# Patient Record
Sex: FEMALE | Race: WHITE | NOT HISPANIC OR LATINO
[De-identification: names, ages, dates, MRNs, and addresses within clinical notes are randomized per-mention and may not be internally consistent; named-entity substitution may affect disease eponyms.]

---

## 2021-11-02 ENCOUNTER — APPOINTMENT (OUTPATIENT)
Dept: NEUROSURGERY | Facility: CLINIC | Age: 81
End: 2021-11-02
Payer: MEDICARE

## 2021-11-09 ENCOUNTER — APPOINTMENT (OUTPATIENT)
Dept: NEUROSURGERY | Facility: CLINIC | Age: 81
End: 2021-11-09
Payer: MEDICARE

## 2021-11-09 VITALS
OXYGEN SATURATION: 97 % | DIASTOLIC BLOOD PRESSURE: 74 MMHG | SYSTOLIC BLOOD PRESSURE: 111 MMHG | BODY MASS INDEX: 22.29 KG/M2 | HEIGHT: 67 IN | WEIGHT: 142 LBS | HEART RATE: 74 BPM

## 2021-11-09 DIAGNOSIS — M54.12 RADICULOPATHY, CERVICAL REGION: ICD-10-CM

## 2021-11-09 PROCEDURE — 99203 OFFICE O/P NEW LOW 30 MIN: CPT

## 2021-11-11 NOTE — PHYSICAL EXAM
[General Appearance - Alert] : alert [General Appearance - In No Acute Distress] : in no acute distress [Oriented To Time, Place, And Person] : oriented to person, place, and time [Impaired Insight] : insight and judgment were intact [Cranial Nerves Optic (II)] : visual acuity intact bilaterally,  pupils equal round and reactive to light [Cranial Nerves Oculomotor (III)] : extraocular motion intact [Cranial Nerves Trigeminal (V)] : facial sensation intact symmetrically [Cranial Nerves Facial (VII)] : face symmetrical [Cranial Nerves Vestibulocochlear (VIII)] : hearing was intact bilaterally [Cranial Nerves Glossopharyngeal (IX)] : tongue and palate midline [Cranial Nerves Accessory (XI - Cranial And Spinal)] : head turning and shoulder shrug symmetric [Cranial Nerves Hypoglossal (XII)] : there was no tongue deviation with protrusion [5] : S1 ankle flexors 5/5 [Normal] : normal [Sclera] : the sclera and conjunctiva were normal [PERRL With Normal Accommodation] : pupils were equal in size, round, reactive to light, with normal accommodation [Hearing Threshold Finger Rub Not Baca] : hearing was normal [Neck Appearance] : the appearance of the neck was normal [] : no respiratory distress [Respiration, Rhythm And Depth] : normal respiratory rhythm and effort [Edema] : there was no peripheral edema [Abnormal Walk] : normal gait [Involuntary Movements] : no involuntary movements were seen [Motor Tone] : muscle strength and tone were normal [Skin Color & Pigmentation] : normal skin color and pigmentation

## 2021-11-18 NOTE — ASSESSMENT
[FreeTextEntry1] : Reviewed Stand up MRI images showing C3/4, C45/ moderate spinal cord compression.  She demonstrates mild decreased ROM with left cervical rotation, and suffers from Left sided radicular pain in upper traps to shoulder.  I recommend conservative management at this time considering her age, and fully functional status.\par \par 1)  PT - cervical\par 2)  r/u PRN

## 2021-11-18 NOTE — HISTORY OF PRESENT ILLNESS
[FreeTextEntry1] : chronic neck/ back pain [de-identified] : 81 yo female with PMH of HTN, who complains of chronic neck pain with onset of 15 years which has worsened over the last year.  She arrives for an initial consult and imaging review.  \par Pain is in the left cervical to upper trap region rated 8/10, range 4/10 to 9/10.  \par Aggravated:  standing, turning\par Relieved:  heat, lying down, medications, rest\par Pain management:  treated by a Chiropractor with minimal relief.  PT made her feel worse with fatigue.  \par Pain medications include OTC Advil, Tylenol, and topical creams.   \par \par Stand up MRI reports in allscripts:\par 9/30/21 MRI Cervical wo\par 9/30/21 MRI Lumbar wo

## 2021-11-18 NOTE — REVIEW OF SYSTEMS
[Negative] : Endocrine [Abdominal Pain] : no abdominal pain [Vomiting] : no vomiting [Diarrhea] : no diarrhea [Incontinence] : no incontinence [Skin Lesions] : no skin lesions [Easy Bleeding] : no tendency for easy bleeding [Easy Bruising] : no tendency for easy bruising

## 2023-11-01 ENCOUNTER — APPOINTMENT (RX ONLY)
Dept: URBAN - METROPOLITAN AREA CLINIC 123 | Facility: CLINIC | Age: 83
Setting detail: DERMATOLOGY
End: 2023-11-01

## 2023-11-01 DIAGNOSIS — L85.3 XEROSIS CUTIS: ICD-10-CM

## 2023-11-01 DIAGNOSIS — L57.0 ACTINIC KERATOSIS: ICD-10-CM

## 2023-11-01 DIAGNOSIS — L82.0 INFLAMED SEBORRHEIC KERATOSIS: ICD-10-CM

## 2023-11-01 DIAGNOSIS — Z85.828 PERSONAL HISTORY OF OTHER MALIGNANT NEOPLASM OF SKIN: ICD-10-CM

## 2023-11-01 DIAGNOSIS — D18.0 HEMANGIOMA: ICD-10-CM

## 2023-11-01 DIAGNOSIS — D22 MELANOCYTIC NEVI: ICD-10-CM

## 2023-11-01 DIAGNOSIS — L81.4 OTHER MELANIN HYPERPIGMENTATION: ICD-10-CM | Status: STABLE

## 2023-11-01 DIAGNOSIS — L82.1 OTHER SEBORRHEIC KERATOSIS: ICD-10-CM

## 2023-11-01 PROBLEM — D22.5 MELANOCYTIC NEVI OF TRUNK: Status: ACTIVE | Noted: 2023-11-01

## 2023-11-01 PROBLEM — D18.01 HEMANGIOMA OF SKIN AND SUBCUTANEOUS TISSUE: Status: ACTIVE | Noted: 2023-11-01

## 2023-11-01 PROCEDURE — ? ADDITIONAL NOTES

## 2023-11-01 PROCEDURE — 17000 DESTRUCT PREMALG LESION: CPT | Mod: 59

## 2023-11-01 PROCEDURE — ? LIQUID NITROGEN

## 2023-11-01 PROCEDURE — 17111 DESTRUCTION B9 LESIONS 15/>: CPT

## 2023-11-01 PROCEDURE — 99203 OFFICE O/P NEW LOW 30 MIN: CPT | Mod: 25

## 2023-11-01 PROCEDURE — ? SUNSCREEN RECOMMENDATIONS

## 2023-11-01 PROCEDURE — ? COUNSELING

## 2023-11-01 ASSESSMENT — LOCATION DETAILED DESCRIPTION DERM
LOCATION DETAILED: LEFT CENTRAL MALAR CHEEK
LOCATION DETAILED: RIGHT ANTERIOR DISTAL THIGH
LOCATION DETAILED: LEFT ANTERIOR PROXIMAL THIGH
LOCATION DETAILED: RIGHT MID-UPPER BACK
LOCATION DETAILED: RIGHT RIB CAGE
LOCATION DETAILED: LEFT PROXIMAL DORSAL FOREARM
LOCATION DETAILED: NASAL DORSUM
LOCATION DETAILED: LEFT ANTERIOR DISTAL THIGH
LOCATION DETAILED: RIGHT PROXIMAL DORSAL FOREARM
LOCATION DETAILED: LEFT VENTRAL DISTAL FOREARM
LOCATION DETAILED: RIGHT ANTERIOR SHOULDER
LOCATION DETAILED: LEFT DISTAL DORSAL FOREARM
LOCATION DETAILED: RIGHT RADIAL DORSAL HAND
LOCATION DETAILED: RIGHT DISTAL DORSAL FOREARM
LOCATION DETAILED: RIGHT ANTERIOR PROXIMAL THIGH
LOCATION DETAILED: LEFT SUPERIOR UPPER BACK
LOCATION DETAILED: RIGHT TRAGUS
LOCATION DETAILED: RIGHT POSTERIOR SHOULDER
LOCATION DETAILED: RIGHT ULNAR DORSAL HAND
LOCATION DETAILED: LEFT VENTRAL PROXIMAL FOREARM

## 2023-11-01 ASSESSMENT — LOCATION ZONE DERM
LOCATION ZONE: EAR
LOCATION ZONE: FACE
LOCATION ZONE: HAND
LOCATION ZONE: ARM
LOCATION ZONE: NOSE
LOCATION ZONE: LEG
LOCATION ZONE: TRUNK

## 2023-11-01 ASSESSMENT — LOCATION SIMPLE DESCRIPTION DERM
LOCATION SIMPLE: RIGHT THIGH
LOCATION SIMPLE: RIGHT EAR
LOCATION SIMPLE: RIGHT SHOULDER
LOCATION SIMPLE: LEFT FOREARM
LOCATION SIMPLE: LEFT THIGH
LOCATION SIMPLE: NOSE
LOCATION SIMPLE: RIGHT UPPER BACK
LOCATION SIMPLE: RIGHT FOREARM
LOCATION SIMPLE: LEFT CHEEK
LOCATION SIMPLE: LEFT UPPER BACK
LOCATION SIMPLE: RIGHT HAND
LOCATION SIMPLE: ABDOMEN

## 2023-11-01 NOTE — PROCEDURE: LIQUID NITROGEN
Spray Paint Technique: No
Show Aperture Variable?: Yes
Medical Necessity Information: It is in your best interest to select a reason for this procedure from the list below. All of these items fulfill various CMS LCD requirements except the new and changing color options.
Medical Necessity Clause: This procedure was medically necessary because the lesions that were treated were:
Number Of Freeze-Thaw Cycles: 3 freeze-thaw cycles
Duration Of Freeze Thaw-Cycle (Seconds): 6
Detail Level: Detailed
Post-Care Instructions: I reviewed with the patient in detail post-care instructions. Patient is to wear sunprotection, and avoid picking at any of the treated lesions. Pt may apply Vaseline to crusted or scabbing areas.
Spray Paint Text: The liquid nitrogen was applied to the skin utilizing a spray paint frosting technique.
Consent: The patient's consent was obtained including but not limited to risks of crusting, scabbing, blistering, scarring, darker or lighter pigmentary change, recurrence, incomplete removal and infection.
Duration Of Freeze Thaw-Cycle (Seconds): 8
Simple: Patient demonstrates quick and easy understanding

## 2024-02-26 ENCOUNTER — APPOINTMENT (RX ONLY)
Dept: URBAN - METROPOLITAN AREA CLINIC 123 | Facility: CLINIC | Age: 84
Setting detail: DERMATOLOGY
End: 2024-02-26

## 2024-02-26 DIAGNOSIS — Z85.828 PERSONAL HISTORY OF OTHER MALIGNANT NEOPLASM OF SKIN: ICD-10-CM

## 2024-02-26 DIAGNOSIS — L57.0 ACTINIC KERATOSIS: ICD-10-CM | Status: INADEQUATELY CONTROLLED

## 2024-02-26 DIAGNOSIS — L81.4 OTHER MELANIN HYPERPIGMENTATION: ICD-10-CM

## 2024-02-26 DIAGNOSIS — L82.0 INFLAMED SEBORRHEIC KERATOSIS: ICD-10-CM

## 2024-02-26 PROCEDURE — 17003 DESTRUCT PREMALG LES 2-14: CPT | Mod: 59

## 2024-02-26 PROCEDURE — ? COUNSELING

## 2024-02-26 PROCEDURE — 99212 OFFICE O/P EST SF 10 MIN: CPT | Mod: 25

## 2024-02-26 PROCEDURE — 17000 DESTRUCT PREMALG LESION: CPT | Mod: 59

## 2024-02-26 PROCEDURE — ? LIQUID NITROGEN

## 2024-02-26 PROCEDURE — 17110 DESTRUCTION B9 LES UP TO 14: CPT

## 2024-02-26 ASSESSMENT — LOCATION ZONE DERM
LOCATION ZONE: NECK
LOCATION ZONE: NOSE
LOCATION ZONE: FACE

## 2024-02-26 ASSESSMENT — LOCATION SIMPLE DESCRIPTION DERM
LOCATION SIMPLE: NECK
LOCATION SIMPLE: LEFT CHEEK
LOCATION SIMPLE: RIGHT CHEEK
LOCATION SIMPLE: NOSE

## 2024-02-26 ASSESSMENT — LOCATION DETAILED DESCRIPTION DERM
LOCATION DETAILED: NASAL DORSUM
LOCATION DETAILED: RIGHT SUPERIOR LATERAL BUCCAL CHEEK
LOCATION DETAILED: RIGHT INFERIOR LATERAL NECK
LOCATION DETAILED: RIGHT INFERIOR LATERAL MALAR CHEEK
LOCATION DETAILED: LEFT INFERIOR LATERAL MALAR CHEEK
LOCATION DETAILED: LEFT INFERIOR PREAURICULAR CHEEK
LOCATION DETAILED: RIGHT CENTRAL MANDIBULAR CHEEK
LOCATION DETAILED: RIGHT SUPERIOR LATERAL MALAR CHEEK
LOCATION DETAILED: LEFT SUPERIOR LATERAL BUCCAL CHEEK
LOCATION DETAILED: LEFT SUPERIOR LATERAL MALAR CHEEK
LOCATION DETAILED: RIGHT LATERAL MANDIBULAR CHEEK
LOCATION DETAILED: RIGHT CENTRAL MALAR CHEEK
LOCATION DETAILED: LEFT LATERAL MANDIBULAR CHEEK

## 2024-02-26 NOTE — PROCEDURE: LIQUID NITROGEN
Post-Care Instructions: I reviewed with the patient in detail post-care instructions. Patient is to wear sunprotection, and avoid picking at any of the treated lesions. Pt may apply Vaseline to crusted or scabbing areas.
Render Post-Care Instructions In Note?: no
Detail Level: Detailed
Duration Of Freeze Thaw-Cycle (Seconds): 0
Show Aperture Variable?: Yes
Consent: The patient's consent was obtained including but not limited to risks of crusting, scabbing, blistering, scarring, darker or lighter pigmentary change, recurrence, incomplete removal and infection.
Medical Necessity Information: It is in your best interest to select a reason for this procedure from the list below. All of these items fulfill various CMS LCD requirements except the new and changing color options.
Medical Necessity Clause: This procedure was medically necessary because the lesions that were treated were: growing.
Spray Paint Text: The liquid nitrogen was applied to the skin utilizing a spray paint frosting technique.

## 2024-03-20 ENCOUNTER — APPOINTMENT (RX ONLY)
Dept: URBAN - METROPOLITAN AREA CLINIC 123 | Facility: CLINIC | Age: 84
Setting detail: DERMATOLOGY
End: 2024-03-20

## 2024-03-20 DIAGNOSIS — L82.0 INFLAMED SEBORRHEIC KERATOSIS: ICD-10-CM

## 2024-03-20 DIAGNOSIS — Z41.9 ENCOUNTER FOR PROCEDURE FOR PURPOSES OTHER THAN REMEDYING HEALTH STATE, UNSPECIFIED: ICD-10-CM

## 2024-03-20 DIAGNOSIS — L81.4 OTHER MELANIN HYPERPIGMENTATION: ICD-10-CM

## 2024-03-20 PROCEDURE — ? COUNSELING

## 2024-03-20 PROCEDURE — ? COSMETIC CONSULTATION: FILLERS

## 2024-03-20 PROCEDURE — 17110 DESTRUCTION B9 LES UP TO 14: CPT

## 2024-03-20 PROCEDURE — ? LIQUID NITROGEN

## 2024-03-20 PROCEDURE — ? ADDITIONAL NOTES

## 2024-03-20 PROCEDURE — 99212 OFFICE O/P EST SF 10 MIN: CPT | Mod: 25

## 2024-03-20 ASSESSMENT — LOCATION ZONE DERM
LOCATION ZONE: FACE
LOCATION ZONE: SCALP

## 2024-03-20 ASSESSMENT — LOCATION DETAILED DESCRIPTION DERM
LOCATION DETAILED: RIGHT INFERIOR PREAURICULAR CHEEK
LOCATION DETAILED: RIGHT CENTRAL ZYGOMA
LOCATION DETAILED: RIGHT SUPERIOR LATERAL MALAR CHEEK
LOCATION DETAILED: LEFT SUPERIOR MEDIAL BUCCAL CHEEK
LOCATION DETAILED: RIGHT INFERIOR LATERAL MALAR CHEEK
LOCATION DETAILED: RIGHT INFERIOR POSTAURICULAR SKIN
LOCATION DETAILED: LEFT SUPERIOR CENTRAL BUCCAL CHEEK

## 2024-03-20 ASSESSMENT — LOCATION SIMPLE DESCRIPTION DERM
LOCATION SIMPLE: SCALP
LOCATION SIMPLE: RIGHT ZYGOMA
LOCATION SIMPLE: RIGHT CHEEK
LOCATION SIMPLE: LEFT CHEEK

## 2024-03-20 NOTE — PROCEDURE: ADDITIONAL NOTES
Additional Notes: Rec: Danna Blancas $700/ syringe, rec 1-2 syringes: perioral area. Pt aware 1 hr numb prior. Pt was given numbing cream
Render Risk Assessment In Note?: no
Detail Level: Simple
Additional Notes: (+) hx of fillers in past. Concerned of perioral and midface.

## 2024-03-20 NOTE — PROCEDURE: LIQUID NITROGEN
Duration Of Freeze Thaw-Cycle (Seconds): 6
Show Spray Paint Technique Variable?: Yes
Spray Paint Text: The liquid nitrogen was applied to the skin utilizing a spray paint frosting technique.
Detail Level: Detailed
Post-Care Instructions: I reviewed with the patient in detail post-care instructions. Patient is to wear sunprotection, and avoid picking at any of the treated lesions. Pt may apply Vaseline to crusted or scabbing areas.
Add 52 Modifier (Optional): no
Consent: The patient's consent was obtained including but not limited to risks of crusting, scabbing, blistering, scarring, darker or lighter pigmentary change, recurrence, incomplete removal and infection.
Medical Necessity Information: It is in your best interest to select a reason for this procedure from the list below. All of these items fulfill various CMS LCD requirements except the new and changing color options.
Number Of Freeze-Thaw Cycles: 3 freeze-thaw cycles
Medical Necessity Clause: This procedure was medically necessary because the lesions that were treated were:

## 2024-03-28 ENCOUNTER — APPOINTMENT (RX ONLY)
Dept: URBAN - METROPOLITAN AREA CLINIC 123 | Facility: CLINIC | Age: 84
Setting detail: DERMATOLOGY
End: 2024-03-28

## 2024-03-28 DIAGNOSIS — Z41.9 ENCOUNTER FOR PROCEDURE FOR PURPOSES OTHER THAN REMEDYING HEALTH STATE, UNSPECIFIED: ICD-10-CM

## 2024-03-28 DIAGNOSIS — L57.0 ACTINIC KERATOSIS: ICD-10-CM

## 2024-03-28 PROCEDURE — ? FILLERS

## 2024-03-28 PROCEDURE — 17110 DESTRUCTION B9 LES UP TO 14: CPT | Mod: 79

## 2024-03-28 PROCEDURE — ? PRESCRIPTION

## 2024-03-28 PROCEDURE — ? ELECTRODESICCATION

## 2024-03-28 RX ORDER — TRETIONIN 1 MG/G
CREAM TOPICAL QHS
Qty: 45 | Refills: 11 | Status: ERX | COMMUNITY
Start: 2024-03-28

## 2024-03-28 RX ADMIN — TRETIONIN: 1 CREAM TOPICAL at 00:00

## 2024-03-28 ASSESSMENT — LOCATION DETAILED DESCRIPTION DERM: LOCATION DETAILED: LEFT MEDIAL MALAR CHEEK

## 2024-03-28 ASSESSMENT — LOCATION SIMPLE DESCRIPTION DERM: LOCATION SIMPLE: LEFT CHEEK

## 2024-03-28 ASSESSMENT — LOCATION ZONE DERM: LOCATION ZONE: FACE

## 2024-03-28 NOTE — PROCEDURE: FILLERS
Additional Area 3 Volume In Cc: 0
Include Cannula Information In Note?: No
Anesthesia Volume In Cc: 0.5
Consent: Written consent obtained. Risks include but not limited to bruising, beading, irregular texture, ulceration, infection, allergic reaction, scar formation, incomplete augmentation, temporary nature, procedural pain.
Filler: Restylane Kysse
Lot #: [id number]
Post-Care Instructions: Patient instructed to apply ice to reduce swelling.
Expiration Date (Month Year): 06-
Detail Level: Detailed
Price (Use Numbers Only, No Special Characters Or $): 1400
Map Statment: See Attach Map for Complete Details
Include Documentation That Aspiration Was Performed Prior To Injecting Filler:: Yes
Lot #: [id number]
Aspiration Statement: Aspiration was performed prior to injecting site with filler.
Expiration Date (Month Year): 06/30/2923

## 2024-03-28 NOTE — PROCEDURE: ELECTRODESICCATION
Detail Level: Simple
Consent: The patient's consent was obtained including but not limited to risks of crusting, scabbing, blistering, scarring, darker or lighter pigmentary change, recurrence, incomplete removal and infection.
Medical Necessity Clause: This procedure was medically necessary because the lesions that were treated were:
Medical Necessity Information: It is in your best interest to select a reason for this procedure from the list below. All of these items fulfill various CMS LCD requirements except the new and changing color options.
Include Z78.9 (Other Specified Conditions Influencing Health Status) As An Associated Diagnosis?: No
Miles: 3
Post-Care Instructions: I reviewed with the patient in detail post-care instructions. Patient is to wear sunprotection, and avoid picking at any of the treated lesions. Pt may apply Vaseline to crusted or scabbing areas

## 2024-07-03 ENCOUNTER — APPOINTMENT (RX ONLY)
Dept: URBAN - METROPOLITAN AREA CLINIC 123 | Facility: CLINIC | Age: 84
Setting detail: DERMATOLOGY
End: 2024-07-03

## 2024-07-03 DIAGNOSIS — L82.0 INFLAMED SEBORRHEIC KERATOSIS: ICD-10-CM

## 2024-07-03 DIAGNOSIS — K13.0 DISEASES OF LIPS: ICD-10-CM

## 2024-07-03 DIAGNOSIS — Z85.828 PERSONAL HISTORY OF OTHER MALIGNANT NEOPLASM OF SKIN: ICD-10-CM

## 2024-07-03 PROCEDURE — 17111 DESTRUCTION B9 LESIONS 15/>: CPT

## 2024-07-03 PROCEDURE — ? ADDITIONAL NOTES

## 2024-07-03 PROCEDURE — ? COUNSELING

## 2024-07-03 PROCEDURE — ? BENIGN DESTRUCTION

## 2024-07-03 PROCEDURE — 99212 OFFICE O/P EST SF 10 MIN: CPT | Mod: 25

## 2024-07-03 ASSESSMENT — LOCATION ZONE DERM
LOCATION ZONE: NOSE
LOCATION ZONE: LIP
LOCATION ZONE: FACE

## 2024-07-03 ASSESSMENT — LOCATION DETAILED DESCRIPTION DERM
LOCATION DETAILED: LEFT LATERAL MANDIBULAR CHEEK
LOCATION DETAILED: RIGHT LATERAL BUCCAL CHEEK
LOCATION DETAILED: LEFT INFERIOR CENTRAL MALAR CHEEK
LOCATION DETAILED: RIGHT SUPERIOR LATERAL BUCCAL CHEEK
LOCATION DETAILED: LEFT INFERIOR LATERAL MALAR CHEEK
LOCATION DETAILED: LEFT CENTRAL MALAR CHEEK
LOCATION DETAILED: NASAL DORSUM
LOCATION DETAILED: RIGHT LATERAL MANDIBULAR CHEEK
LOCATION DETAILED: LEFT SUPERIOR LATERAL BUCCAL CHEEK
LOCATION DETAILED: RIGHT INFERIOR VERMILION LIP

## 2024-07-03 ASSESSMENT — LOCATION SIMPLE DESCRIPTION DERM
LOCATION SIMPLE: RIGHT CHEEK
LOCATION SIMPLE: RIGHT LIP
LOCATION SIMPLE: NOSE
LOCATION SIMPLE: LEFT CHEEK

## 2024-07-03 NOTE — PROCEDURE: BENIGN DESTRUCTION
Render Post-Care Instructions In Note?: no
Post-Care Instructions: I reviewed with the patient in detail post-care instructions. Patient is to wear sunprotection, and avoid picking at any of the treated lesions. Pt may apply Vaseline to crusted or scabbing areas.
Medical Necessity Information: It is in your best interest to select a reason for this procedure from the list below. All of these items fulfill various CMS LCD requirements except the new and changing color options.
Detail Level: Detailed
Medical Necessity Clause: This procedure was medically necessary because the lesions that were treated were:
Treatment Number (Will Not Render If 0): 0
Consent: The patient's consent was obtained including but not limited to risks of crusting, scabbing, blistering, scarring, darker or lighter pigmentary change, recurrence, incomplete removal and infection.

## 2024-07-03 NOTE — PROCEDURE: ADDITIONAL NOTES
Detail Level: Simple
Render Risk Assessment In Note?: no
Additional Notes: Pt applied BLT prior to appt.

## 2024-08-12 NOTE — PROCEDURE: COUNSELING
Detail Level: Zone
Patient informed of need for labs.   .INFORMED PATIENT & PATIENT VERBALIZED UNDERSTANDING.    
Detail Level: Detailed

## 2025-06-17 ENCOUNTER — EMERGENCY (EMERGENCY)
Facility: HOSPITAL | Age: 85
LOS: 1 days | End: 2025-06-17
Attending: STUDENT IN AN ORGANIZED HEALTH CARE EDUCATION/TRAINING PROGRAM
Payer: MEDICARE

## 2025-06-17 VITALS
RESPIRATION RATE: 22 BRPM | TEMPERATURE: 98 F | OXYGEN SATURATION: 98 % | SYSTOLIC BLOOD PRESSURE: 184 MMHG | DIASTOLIC BLOOD PRESSURE: 91 MMHG | HEART RATE: 97 BPM | HEIGHT: 66 IN | WEIGHT: 130.07 LBS

## 2025-06-17 VITALS
TEMPERATURE: 98 F | HEART RATE: 92 BPM | SYSTOLIC BLOOD PRESSURE: 169 MMHG | OXYGEN SATURATION: 98 % | RESPIRATION RATE: 20 BRPM | DIASTOLIC BLOOD PRESSURE: 80 MMHG

## 2025-06-17 LAB
ALBUMIN SERPL ELPH-MCNC: 4.7 G/DL — SIGNIFICANT CHANGE UP (ref 3.3–5)
ALP SERPL-CCNC: 106 U/L — SIGNIFICANT CHANGE UP (ref 40–120)
ALT FLD-CCNC: 16 U/L — SIGNIFICANT CHANGE UP (ref 10–45)
ANION GAP SERPL CALC-SCNC: 15 MMOL/L — SIGNIFICANT CHANGE UP (ref 5–17)
APTT BLD: 28.1 SEC — SIGNIFICANT CHANGE UP (ref 26.1–36.8)
AST SERPL-CCNC: 23 U/L — SIGNIFICANT CHANGE UP (ref 10–40)
BASOPHILS # BLD AUTO: 0.07 K/UL — SIGNIFICANT CHANGE UP (ref 0–0.2)
BASOPHILS NFR BLD AUTO: 0.9 % — SIGNIFICANT CHANGE UP (ref 0–2)
BILIRUB SERPL-MCNC: 0.6 MG/DL — SIGNIFICANT CHANGE UP (ref 0.2–1.2)
BLD GP AB SCN SERPL QL: NEGATIVE — SIGNIFICANT CHANGE UP
BUN SERPL-MCNC: 17 MG/DL — SIGNIFICANT CHANGE UP (ref 7–23)
CALCIUM SERPL-MCNC: 9.7 MG/DL — SIGNIFICANT CHANGE UP (ref 8.4–10.5)
CHLORIDE SERPL-SCNC: 103 MMOL/L — SIGNIFICANT CHANGE UP (ref 96–108)
CO2 SERPL-SCNC: 23 MMOL/L — SIGNIFICANT CHANGE UP (ref 22–31)
CREAT SERPL-MCNC: 0.91 MG/DL — SIGNIFICANT CHANGE UP (ref 0.5–1.3)
EGFR: 61 ML/MIN/1.73M2 — SIGNIFICANT CHANGE UP
EGFR: 61 ML/MIN/1.73M2 — SIGNIFICANT CHANGE UP
EOSINOPHIL # BLD AUTO: 0.11 K/UL — SIGNIFICANT CHANGE UP (ref 0–0.5)
EOSINOPHIL NFR BLD AUTO: 1.3 % — SIGNIFICANT CHANGE UP (ref 0–6)
GLUCOSE SERPL-MCNC: 103 MG/DL — HIGH (ref 70–99)
HCT VFR BLD CALC: 40.3 % — SIGNIFICANT CHANGE UP (ref 34.5–45)
HGB BLD-MCNC: 13.3 G/DL — SIGNIFICANT CHANGE UP (ref 11.5–15.5)
IMM GRANULOCYTES # BLD AUTO: 0.03 K/UL — SIGNIFICANT CHANGE UP (ref 0–0.07)
IMM GRANULOCYTES NFR BLD AUTO: 0.4 % — SIGNIFICANT CHANGE UP (ref 0–0.9)
INR BLD: 0.96 RATIO — SIGNIFICANT CHANGE UP (ref 0.85–1.16)
LYMPHOCYTES # BLD AUTO: 1.63 K/UL — SIGNIFICANT CHANGE UP (ref 1–3.3)
LYMPHOCYTES NFR BLD AUTO: 19.9 % — SIGNIFICANT CHANGE UP (ref 13–44)
MAGNESIUM SERPL-MCNC: 2 MG/DL — SIGNIFICANT CHANGE UP (ref 1.6–2.6)
MCHC RBC-ENTMCNC: 31.1 PG — SIGNIFICANT CHANGE UP (ref 27–34)
MCHC RBC-ENTMCNC: 33 G/DL — SIGNIFICANT CHANGE UP (ref 32–36)
MCV RBC AUTO: 94.4 FL — SIGNIFICANT CHANGE UP (ref 80–100)
MONOCYTES # BLD AUTO: 0.48 K/UL — SIGNIFICANT CHANGE UP (ref 0–0.9)
MONOCYTES NFR BLD AUTO: 5.9 % — SIGNIFICANT CHANGE UP (ref 2–14)
NEUTROPHILS # BLD AUTO: 5.86 K/UL — SIGNIFICANT CHANGE UP (ref 1.8–7.4)
NEUTROPHILS NFR BLD AUTO: 71.6 % — SIGNIFICANT CHANGE UP (ref 43–77)
NRBC # BLD AUTO: 0 K/UL — SIGNIFICANT CHANGE UP (ref 0–0)
NRBC # FLD: 0 K/UL — SIGNIFICANT CHANGE UP (ref 0–0)
NRBC BLD AUTO-RTO: 0 /100 WBCS — SIGNIFICANT CHANGE UP (ref 0–0)
NT-PROBNP SERPL-SCNC: 608 PG/ML — HIGH (ref 0–300)
PLATELET # BLD AUTO: 254 K/UL — SIGNIFICANT CHANGE UP (ref 150–400)
PMV BLD: 10.3 FL — SIGNIFICANT CHANGE UP (ref 7–13)
POTASSIUM SERPL-MCNC: 3.7 MMOL/L — SIGNIFICANT CHANGE UP (ref 3.5–5.3)
POTASSIUM SERPL-SCNC: 3.7 MMOL/L — SIGNIFICANT CHANGE UP (ref 3.5–5.3)
PROT SERPL-MCNC: 8.2 G/DL — SIGNIFICANT CHANGE UP (ref 6–8.3)
PROTHROM AB SERPL-ACNC: 11.1 SEC — SIGNIFICANT CHANGE UP (ref 9.9–13.4)
RBC # BLD: 4.27 M/UL — SIGNIFICANT CHANGE UP (ref 3.8–5.2)
RBC # FLD: 13.2 % — SIGNIFICANT CHANGE UP (ref 10.3–14.5)
RH IG SCN BLD-IMP: POSITIVE — SIGNIFICANT CHANGE UP
SODIUM SERPL-SCNC: 141 MMOL/L — SIGNIFICANT CHANGE UP (ref 135–145)
TROPONIN T, HIGH SENSITIVITY RESULT: 11 NG/L — SIGNIFICANT CHANGE UP (ref 0–51)
TROPONIN T, HIGH SENSITIVITY RESULT: 14 NG/L — SIGNIFICANT CHANGE UP (ref 0–51)
WBC # BLD: 8.18 K/UL — SIGNIFICANT CHANGE UP (ref 3.8–10.5)
WBC # FLD AUTO: 8.18 K/UL — SIGNIFICANT CHANGE UP (ref 3.8–10.5)

## 2025-06-17 PROCEDURE — 85610 PROTHROMBIN TIME: CPT

## 2025-06-17 PROCEDURE — 93971 EXTREMITY STUDY: CPT

## 2025-06-17 PROCEDURE — 83880 ASSAY OF NATRIURETIC PEPTIDE: CPT

## 2025-06-17 PROCEDURE — 80053 COMPREHEN METABOLIC PANEL: CPT

## 2025-06-17 PROCEDURE — 71046 X-RAY EXAM CHEST 2 VIEWS: CPT | Mod: 26

## 2025-06-17 PROCEDURE — 85730 THROMBOPLASTIN TIME PARTIAL: CPT

## 2025-06-17 PROCEDURE — 99285 EMERGENCY DEPT VISIT HI MDM: CPT | Mod: 25

## 2025-06-17 PROCEDURE — 86850 RBC ANTIBODY SCREEN: CPT

## 2025-06-17 PROCEDURE — 84484 ASSAY OF TROPONIN QUANT: CPT

## 2025-06-17 PROCEDURE — 93926 LOWER EXTREMITY STUDY: CPT | Mod: 26,LT

## 2025-06-17 PROCEDURE — 86901 BLOOD TYPING SEROLOGIC RH(D): CPT

## 2025-06-17 PROCEDURE — 93010 ELECTROCARDIOGRAM REPORT: CPT

## 2025-06-17 PROCEDURE — 99284 EMERGENCY DEPT VISIT MOD MDM: CPT

## 2025-06-17 PROCEDURE — 71046 X-RAY EXAM CHEST 2 VIEWS: CPT

## 2025-06-17 PROCEDURE — 85025 COMPLETE CBC W/AUTO DIFF WBC: CPT

## 2025-06-17 PROCEDURE — 93926 LOWER EXTREMITY STUDY: CPT

## 2025-06-17 PROCEDURE — 93971 EXTREMITY STUDY: CPT | Mod: 26,LT

## 2025-06-17 PROCEDURE — 93005 ELECTROCARDIOGRAM TRACING: CPT

## 2025-06-17 PROCEDURE — 83735 ASSAY OF MAGNESIUM: CPT

## 2025-06-17 PROCEDURE — 86900 BLOOD TYPING SEROLOGIC ABO: CPT

## 2025-06-17 RX ORDER — ATORVASTATIN CALCIUM 80 MG/1
1 TABLET, FILM COATED ORAL
Qty: 30 | Refills: 0
Start: 2025-06-17 | End: 2025-07-16

## 2025-06-17 NOTE — ED PROVIDER NOTE - PATIENT PORTAL LINK FT
You can access the FollowMyHealth Patient Portal offered by [facility] by registering at the following website: [email or web address]. By joining [employer]’s FollowMyHealth portal, you will also be able to view your health information using other applications (apps) compatible with our system.

## 2025-06-17 NOTE — ED PROVIDER NOTE - CARE PLAN
Principal Discharge DX:	HUNT (dyspnea on exertion)   1 Principal Discharge DX:	HUNT (dyspnea on exertion)  Goal:	PAD

## 2025-06-17 NOTE — ED PROVIDER NOTE - OBJECTIVE STATEMENT
84-year-old female past medical history of hypertension presents with left leg cramping.  Patient states for the last 4 days she has had a feeling of a "rubber band" tightness to the left calf associated with "cold feet".  Patient also endorses approximately 1 year of exertional shortness of breath with ambulating up two flights of steps.  No chest pain.  Patient states she does not particularly associate the leg discomfort with ambulation.

## 2025-06-17 NOTE — CONSULT NOTE ADULT - ASSESSMENT
84F with history of HTN, back pain presenting to ED for few days of LLE swelling. No DVT on venous duplex, however arterial duplex with focal SFA stenosis and segmental occlusion of the BROWN. Palpable PT bilaterally, no motor/sensory deficits, no history of nonhealing wounds, rest pain, or claudication. Overall clinically asymptomatic incidentally discovered PAD.    Recs:  - Outpatient followup with Dr. Beasley for PAD, venous insufficiency evaluation  - Asa 81 and statin for best medical management of PAD  - Dispo per ED  - d/w patient    Discussed with Dr. Beasley    Vascular Surgery  [id number]

## 2025-06-17 NOTE — ED PROVIDER NOTE - PHYSICAL EXAMINATION
PHYSICAL EXAM:   General: well-appearing, appears stated age, not in extremis   HEENT: NC/AT, airway patent  Cardiovascular: regular rate and rhythm, + S1/S2, no murmurs, rubs, gallops appreciated  Respiratory: clear to auscultation bilaterally, good aeration bilaterally, nonlabored respirations  Extremities: Question minimal Edema on the left.  Compartments soft.  No significant discoloration or tenderness to palpation of the calves bilaterally.  Palpable and strong dopplerable right DP pulse, unable to palpate left DP pulse, Present but faint dopplerable DP pulse on the left.  Patient ambulatory with steady gait  Neuro: Alert and oriented x3. Moving all extremities. Ambulatory.  Psychiatric: appropriate mood and affect.   -Rasheeda Bar MD Attending Physician PHYSICAL EXAM:   General: well-appearing, appears stated age, not in extremis   HEENT: NC/AT, airway patent  Cardiovascular: regular rate and rhythm, + S1/S2, no murmurs, rubs, gallops appreciated  Respiratory: clear to auscultation bilaterally, good aeration bilaterally, nonlabored respirations  Extremities: Question minimal Edema on the left.  Compartments soft.  No significant discoloration or tenderness to palpation of the calves bilaterally.  Palpable and strong dopplerable right DP pulse, unable to palpate left DP pulse, Present but faint dopplerable DP pulse on the left.  Patient ambulatory with steady gait. both feet feel equally cool  Neuro: Alert and oriented x3. Moving all extremities. Ambulatory.  Psychiatric: appropriate mood and affect.   -Rasheeda Bar MD Attending Physician

## 2025-06-17 NOTE — ED PROVIDER NOTE - PROGRESS NOTE DETAILS
FANNY Llanos: Patient was endorsed to me by the out-going physician at the usual hour of signout. I have participated in the care of the patient as appropriate, including providing the appropriate level of supervision for the fellow/resident/student/non-physician provider in enacting the ongoing plan as reviewed with the out-going physician. duplex result appreciated. vasc res bedside. foot warm and skin color nl. informed pt. awaiting vasc bedside eval for decision on ac. The patient wishes to be discharged against medical advice. I have assessed the patient's mental status and  the patient has capacity to make this decision. I have explained the risks of leaving without full treatment, including severe disability and death, which the patient understands and is willing to accept. I have answered all of the patient's questions. I reiterated my medical opinion and advised the patient to return at any time. We discussed the further workup outside of the current visit and return precautions. The patient signed the hospital written informed consent form.

## 2025-06-17 NOTE — ED ADULT NURSE NOTE - OBJECTIVE STATEMENT
84y f pt c/o left leg cramping x 4 days; pt describes as feeling tightness around calf along with cold feet; pt states has been experiencing HUNT for about a year; pt denies long distance trip recently; aox3; no resp distress; pt does not appear out of breath; no chest pain; no abd pain; no n/v/d; no fever/chills; pt able to ambulate on own without pain; iv placed; labs drawn; pt waiting for ultrasound of leg; daughter at bedside; adv pt not to eat or drink at this time

## 2025-06-17 NOTE — ED PROVIDER NOTE - CLINICAL SUMMARY MEDICAL DECISION MAKING FREE TEXT BOX
History and physical as noted.  Will check both venous and arterial duplex in the left lower extremity.  Given exertional chest discomfort over the last year, will evaluate for cardiac etiology such as mild CHF but does not appear clinically overloaded on exam.  Low clinical suspicion for PE given 1 year of shortness of breath (would expect a shorter duration of time for acute PE)  and only a few days of left calf pain/swelling.  However if ultrasound of the lower extremities are abnormal can pursue workup for PE further if clinically indicated.Ultrasound arterial of the left lower extremity also ordered given concern for possible arterial involvement given faint dopplerable pulse and nonpalpable pulse which is grossly different from the right lower extremity.  Patient is not describing symptoms consistent with claudication however, not worse with walking

## 2025-06-17 NOTE — CONSULT NOTE ADULT - SUBJECTIVE AND OBJECTIVE BOX
VASCULAR SURGERY CONSULT NOTE    HPI:  84F with history of HTN, back pain presenting to ED for few days of LLE swelling. Per documentation patient was initially complaining of associated tightness in the calf however during examination denies pain in either foot or at rest. Occasionally has cramping in the calves at night. Able to walk about 1/2 mile at a time but is limited by back pain. No history of nonhealing wounds. Patient admits both feet are always cold. Has never seen a vascular surgeon before.     VITALS & I/Os:  Vital Signs Last 24 Hrs  T(C): 36.7 (2025 13:23), Max: 36.7 (2025 11:54)  T(F): 98 (2025 13:23), Max: 98 (2025 11:54)  HR: 76 (2025 13:23) (76 - 97)  BP: 201/97 (2025 13:23) (184/91 - 201/97)  BP(mean): --  RR: 20 (2025 13:23) (20 - 22)  SpO2: 97% (2025 13:23) (97% - 98%)    Parameters below as of 2025 13:23  Patient On (Oxygen Delivery Method): room air        I&O's Summary      Physical Exam:  General: NAD, female appearing stated age  Neuro: Awake, alert and orientedx4  CV: Hemodynamically stable  Resp: Nonlabored breathing on RA  Vascular: Palpable femoral, popliteal, PT bilaterally. Absent DP pulse/ds bilat. Toes cool compared to legs bilaterally, motor/sensation intact, no active wounds    Laboratory:                        13.3   8.18  )-----------( 254      (  @ 13:34 )             40.3                                  Phos: xx M.0  141  |  103  |  17  ----------------------------<  103  3.7   |  23  |  0.91             TPro 8.2 / Alb 4.7 / TBili 0.6 / DBili x  / AST/AST 23/16 / AlkPhos 106    PT/INR/PTT - ( @ 13:34) PT: 11.1 sec; INR: 0.96 ratio ; PTT: 28.1 sec          Urinalysis Basic - ( 2025 13:34 )    Color: x / Appearance: x / SG: x / pH: x  Gluc: 103 mg/dL / Ketone: x  / Bili: x / Urobili: x   Blood: x / Protein: x / Nitrite: x   Leuk Esterase: x / RBC: x / WBC x   Sq Epi: x / Non Sq Epi: x / Bacteria: x          IMAGING:  < from: [facility] Duplex Low Ext Arterial, Ltd, Left (25 @ 16:34) >  The peak systolic velocities of the Left lower extremity are as follows:    Distal external iliac artery: 123 cm/s  Common femoral artery: 190 cm/s  Deep femoral artery: 118 cm/s  Superficial femoral artery: 93 cm/s proximal,  70 cm/s mid, 101, 354 cm/s   distal  Popliteal artery: 69 cm/s  Tibioperoneal trunk: 66 cm/s  Posterior tibial artery: 78 cm/s proximal,  68 cm/s mid, 83 cm/s distal  Peroneal artery: 198 cm/s proximal,  55 cm/s mid, 23 cm/s distal  Anterior tibial artery: 43 cm/s proximal,  45 cm/s mid, OCCLUDED distal  Dorsalis pedis artery: 13 cm/s    Impression:    There is a focal high-grade stenosis of the left superficial femoral   artery in the distal thigh.  There is a greater than 50% stenosis of the left peroneal artery in the   proximal calf.  There is a segmental occlusion of the left anterior tibial artery in the   distal calf.    --- End of Report ---    < end of copied text >  < from: [facility] Duplex Lower Ext Vein Scan, Left (25 @ 15:52) >  IMPRESSION:  No evidence of left lower extremity deep venous thrombosis.    --- End of Report ---    < end of copied text >

## 2025-06-17 NOTE — ED ADULT TRIAGE NOTE - CHIEF COMPLAINT QUOTE
left leg swelling since friday, no redness, patient states it is not painful  Denies chest pain, SOB

## 2025-06-17 NOTE — ED PROVIDER NOTE - CARE PROVIDER_API CALL
Julian Beasley  Radiology Vascular & Interventional Radiology  [address], [address]  [place] [address]  Phone: [phone]  Fax: [phone]  Follow Up Time: 4-6 Days

## 2025-06-17 NOTE — ED ADULT NURSE NOTE - NSFALLHARMRISKINTERV_ED_ALL_ED

## 2025-06-17 NOTE — ED PROVIDER NOTE - NSFOLLOWUPINSTRUCTIONS_ED_ALL_ED_FT
IMPORTANT INSTRUCTIONS FROM Dr. ORTA:    Return to the ER if you change your mind.   Aspirin over the counter 81 mg each day.  Atorvastatin daily.  See Dr Beasley as discussed.    Please follow up with your personal medical doctor in 24-48 hours.   Bring results from today to your visit.    If you were advised to take any medications - be sure to review the package insert.    If your symptoms change, get worse or if you have any new symptoms, come to the ER right away.  If you have any questions, call the ER at the phone number on this page.

## 2025-06-18 ENCOUNTER — TRANSCRIPTION ENCOUNTER (OUTPATIENT)
Age: 85
End: 2025-06-18

## 2025-06-18 PROBLEM — Z78.9 OTHER SPECIFIED HEALTH STATUS: Chronic | Status: ACTIVE | Noted: 2025-06-17

## 2025-06-27 ENCOUNTER — APPOINTMENT (OUTPATIENT)
Dept: VASCULAR SURGERY | Facility: CLINIC | Age: 85
End: 2025-06-27
Payer: MEDICARE

## 2025-06-27 VITALS
SYSTOLIC BLOOD PRESSURE: 153 MMHG | DIASTOLIC BLOOD PRESSURE: 80 MMHG | BODY MASS INDEX: 22.23 KG/M2 | HEIGHT: 66.5 IN | HEART RATE: 72 BPM | TEMPERATURE: 98.3 F | WEIGHT: 140 LBS

## 2025-06-27 VITALS — DIASTOLIC BLOOD PRESSURE: 77 MMHG | HEART RATE: 71 BPM | SYSTOLIC BLOOD PRESSURE: 136 MMHG

## 2025-06-27 PROCEDURE — 93922 UPR/L XTREMITY ART 2 LEVELS: CPT

## 2025-06-27 PROCEDURE — 99204 OFFICE O/P NEW MOD 45 MIN: CPT

## 2025-06-27 PROCEDURE — 93971 EXTREMITY STUDY: CPT

## 2025-06-27 RX ORDER — LOSARTAN POTASSIUM 100 MG/1
TABLET, FILM COATED ORAL
Refills: 0 | Status: ACTIVE | COMMUNITY

## 2025-06-27 RX ORDER — ESCITALOPRAM OXALATE 5 MG/1
TABLET, FILM COATED ORAL
Refills: 0 | Status: ACTIVE | COMMUNITY

## 2025-06-27 RX ORDER — ALPRAZOLAM 2 MG/1
TABLET ORAL
Refills: 0 | Status: ACTIVE | COMMUNITY

## 2025-06-27 RX ORDER — ASPIRIN 81 MG
81 TABLET, DELAYED RELEASE (ENTERIC COATED) ORAL
Refills: 0 | Status: ACTIVE | COMMUNITY

## 2025-06-27 RX ORDER — ATORVASTATIN CALCIUM 80 MG/1
TABLET, FILM COATED ORAL
Refills: 0 | Status: ACTIVE | COMMUNITY